# Patient Record
Sex: MALE | Race: WHITE | NOT HISPANIC OR LATINO | Employment: FULL TIME | URBAN - METROPOLITAN AREA
[De-identification: names, ages, dates, MRNs, and addresses within clinical notes are randomized per-mention and may not be internally consistent; named-entity substitution may affect disease eponyms.]

---

## 2017-08-28 ENCOUNTER — OFFICE VISIT - HEALTHEAST (OUTPATIENT)
Dept: FAMILY MEDICINE | Facility: CLINIC | Age: 32
End: 2017-08-28

## 2017-08-28 DIAGNOSIS — E78.5 HYPERLIPIDEMIA: ICD-10-CM

## 2017-08-28 DIAGNOSIS — I10 HYPERTENSION: ICD-10-CM

## 2017-08-28 DIAGNOSIS — Z00.00 ROUTINE GENERAL MEDICAL EXAMINATION AT A HEALTH CARE FACILITY: ICD-10-CM

## 2017-08-28 LAB
CHOLEST SERPL-MCNC: 252 MG/DL
FASTING STATUS PATIENT QL REPORTED: YES
HDLC SERPL-MCNC: 29 MG/DL
LDLC SERPL CALC-MCNC: 122 MG/DL
LDLC SERPL CALC-MCNC: ABNORMAL MG/DL
TRIGL SERPL-MCNC: 408 MG/DL

## 2017-08-28 ASSESSMENT — MIFFLIN-ST. JEOR: SCORE: 2353.53

## 2017-09-11 ENCOUNTER — AMBULATORY - HEALTHEAST (OUTPATIENT)
Dept: NURSING | Facility: CLINIC | Age: 32
End: 2017-09-11

## 2017-10-17 ENCOUNTER — COMMUNICATION - HEALTHEAST (OUTPATIENT)
Dept: FAMILY MEDICINE | Facility: CLINIC | Age: 32
End: 2017-10-17

## 2017-10-17 ENCOUNTER — AMBULATORY - HEALTHEAST (OUTPATIENT)
Dept: NURSING | Facility: CLINIC | Age: 32
End: 2017-10-17

## 2017-10-17 DIAGNOSIS — R74.8 ELEVATED LIVER ENZYMES: ICD-10-CM

## 2017-10-17 DIAGNOSIS — E78.5 HYPERLIPIDEMIA: ICD-10-CM

## 2017-12-02 ENCOUNTER — AMBULATORY - HEALTHEAST (OUTPATIENT)
Dept: LAB | Facility: HOSPITAL | Age: 32
End: 2017-12-02

## 2017-12-02 DIAGNOSIS — E78.5 HYPERLIPIDEMIA: ICD-10-CM

## 2017-12-02 DIAGNOSIS — R74.8 ELEVATED LIVER ENZYMES: ICD-10-CM

## 2017-12-02 LAB
CHOLEST SERPL-MCNC: 238 MG/DL
FASTING STATUS PATIENT QL REPORTED: YES
HDLC SERPL-MCNC: 28 MG/DL
LDLC SERPL CALC-MCNC: 157 MG/DL
TRIGL SERPL-MCNC: 267 MG/DL

## 2017-12-04 LAB
HAV IGM SERPL QL IA: NEGATIVE
HBV CORE IGM SERPL QL IA: NEGATIVE
HBV SURFACE AG SERPL QL IA: NEGATIVE
HCV AB SERPL QL IA: NEGATIVE

## 2018-02-12 ENCOUNTER — AMBULATORY - HEALTHEAST (OUTPATIENT)
Dept: NURSING | Facility: CLINIC | Age: 33
End: 2018-02-12

## 2018-04-08 ENCOUNTER — COMMUNICATION - HEALTHEAST (OUTPATIENT)
Dept: FAMILY MEDICINE | Facility: CLINIC | Age: 33
End: 2018-04-08

## 2018-04-08 DIAGNOSIS — R03.0 ELEVATED BLOOD PRESSURE READING WITHOUT DIAGNOSIS OF HYPERTENSION: ICD-10-CM

## 2018-07-23 ENCOUNTER — COMMUNICATION - HEALTHEAST (OUTPATIENT)
Dept: FAMILY MEDICINE | Facility: CLINIC | Age: 33
End: 2018-07-23

## 2018-07-23 DIAGNOSIS — R03.0 ELEVATED BLOOD PRESSURE READING WITHOUT DIAGNOSIS OF HYPERTENSION: ICD-10-CM

## 2018-08-13 ENCOUNTER — OFFICE VISIT - HEALTHEAST (OUTPATIENT)
Dept: FAMILY MEDICINE | Facility: CLINIC | Age: 33
End: 2018-08-13

## 2018-08-13 DIAGNOSIS — E78.5 HYPERLIPIDEMIA: ICD-10-CM

## 2018-08-13 DIAGNOSIS — E66.01 MORBID OBESITY (H): ICD-10-CM

## 2018-08-13 DIAGNOSIS — I10 HTN (HYPERTENSION): ICD-10-CM

## 2018-08-13 DIAGNOSIS — R03.0 ELEVATED BLOOD PRESSURE READING WITHOUT DIAGNOSIS OF HYPERTENSION: ICD-10-CM

## 2018-08-13 RX ORDER — LISINOPRIL 20 MG/1
20 TABLET ORAL DAILY
Qty: 90 TABLET | Refills: 3 | Status: SHIPPED
Start: 2018-08-13 | End: 2022-03-29

## 2018-08-13 RX ORDER — CHLORAL HYDRATE 500 MG
2 CAPSULE ORAL DAILY
Status: SHIPPED | COMMUNITY
Start: 2018-08-13 | End: 2022-03-29

## 2018-08-13 ASSESSMENT — MIFFLIN-ST. JEOR: SCORE: 2373.38

## 2018-09-02 ENCOUNTER — COMMUNICATION - HEALTHEAST (OUTPATIENT)
Dept: FAMILY MEDICINE | Facility: CLINIC | Age: 33
End: 2018-09-02

## 2018-09-02 DIAGNOSIS — R03.0 ELEVATED BLOOD PRESSURE READING WITHOUT DIAGNOSIS OF HYPERTENSION: ICD-10-CM

## 2018-09-03 RX ORDER — LISINOPRIL 20 MG/1
20 TABLET ORAL DAILY
Qty: 90 TABLET | Refills: 3 | Status: SHIPPED | OUTPATIENT
Start: 2018-09-03 | End: 2022-03-29

## 2021-05-31 VITALS — WEIGHT: 305 LBS | HEIGHT: 72 IN | BODY MASS INDEX: 41.31 KG/M2

## 2021-06-01 VITALS — BODY MASS INDEX: 41.9 KG/M2 | HEIGHT: 72 IN | WEIGHT: 309.38 LBS

## 2021-06-12 NOTE — PROGRESS NOTES
Assessment/ Plan     1. Routine general medical examination at a health care facility    The following high BMI interventions were performed this visit: encouragement to exercise and weight monitoring   Reviewed weight loss options.  He can be considered for a referral to nutritionist or possibly be referred to the [place] [facility]    2. Hypertension    Recommend starting lisinopril  Review potential side effects    Recommend baseline laboratory testing  Encouraged improvement in diet and recommend weight loss    - Basic Metabolic Panel  - HM2(CBC w/o Differential)  - Basic Metabolic Panel  - Lipid Cascade  - Hepatic Profile  - HM2(CBC w/o Differential)    Recommend follow-up to recheck his blood pressure  Favor an ECG in the future for a baseline    3. Hyperlipidemia    Check cholesterol testing  - Lipid Cascade; Future  - Lipid Cascade  - Custom LDL Cholesterol, Direct      Subjective:       Mayco Bobo is a 31 y.o. male who presents to the clinic to reestablish care.  He presents for a complete physical examination.  He states that he has been seen at another clinic in the recent past.  His medical history is generally notable for hyperlipidemia.  His last total cholesterol was 211 with an LDL of 136.  His triglycerides were elevated to 239 and HDL was low at 27.  He did take a statin cholesterol-lowering medication for period of time but has since discontinued that.  He has noted that his blood pressure has been more elevated recently.  He did have elevated readings while visiting his dentist with a value of 138/110 and 132/102.  He has had elevated blood pressure readings in the past.  In general, he has felt well.  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  He likely reacted to the soap and since starting a new job he has not had ongoing symptoms.  He states he generally feels well.  He remains  "overweight and it can be challenging for him to get regular aerobic exercise.  Review of systems negative for headache, disease, chest pain, or palpitations.  Only history is updated in the chart.    The following portions of the patient's history were reviewed and updated as appropriate: allergies, current medications, past family history, past medical history, past social history, past surgical history and problem list.    Review of Systems   A 12 point comprehensive review of systems was negative except as noted.      Current Outpatient Prescriptions   Medication Sig Dispense Refill     MULTIVIT-MINERALS/FERROUS FUM (MULTI VITAMIN ORAL) Take by mouth daily.       lisinopril (PRINIVIL,ZESTRIL) 10 MG tablet Take 1 tablet (10 mg total) by mouth daily. 30 tablet 3     No current facility-administered medications for this visit.        Objective:      /88  Pulse 88  Temp 98.6  F (37  C) (Oral)   Resp 20  Ht 5' 11.5\" (1.816 m)  Wt (!) 305 lb (138.3 kg)  BMI 41.95 kg/m2      General appearance: alert, appears stated age and cooperative  Head: Normocephalic, without obvious abnormality, atraumatic  Eyes: conjunctivae/corneas clear. PERRL, EOM's intact.   Ears: normal TM's and external ear canals both ears  Nose: Nares normal. Septum midline. Mucosa normal. No drainage or sinus tenderness.  Throat: lips, mucosa, and tongue normal; teeth and gums normal  Neck: no adenopathy, no carotid bruit, no JVD, supple, symmetrical, trachea midline and thyroid not enlarged, symmetric, no tenderness/mass/nodules  Back: symmetric, no curvature. ROM normal. No CVA tenderness.  Lungs: clear to auscultation bilaterally  Heart: regular rate and rhythm, S1, S2 normal, no murmur, click, rub or gallop  Abdomen: soft, non-tender; bowel sounds normal; no masses,  no organomegaly  Genitourinary: No scrotal masses, no inguinal hernia  Extremities: extremities normal, atraumatic, no cyanosis or edema  Pulses: 2+ and symmetric  Skin: " Skin color, texture, turgor normal. No rashes or lesions  Lymph nodes: Cervical, supraclavicular, and axillary nodes normal.  Neurologic: Alert and oriented X 3. Normal symmetric reflexes. Normal coordination and gait         Recent Results (from the past 168 hour(s))   Basic Metabolic Panel   Result Value Ref Range    Sodium 138 136 - 145 mmol/L    Potassium 4.0 3.5 - 5.0 mmol/L    Chloride 103 98 - 107 mmol/L    CO2 27 22 - 31 mmol/L    Anion Gap, Calculation 8 5 - 18 mmol/L    Glucose 78 70 - 125 mg/dL    Calcium 9.4 8.5 - 10.5 mg/dL    BUN 11 8 - 22 mg/dL    Creatinine 0.76 0.70 - 1.30 mg/dL    GFR MDRD Af Amer >60 >60 mL/min/1.73m2    GFR MDRD Non Af Amer >60 >60 mL/min/1.73m2   Lipid Cascade   Result Value Ref Range    Cholesterol 252 (H) <=199 mg/dL    Triglycerides 408 (H) <=149 mg/dL    HDL Cholesterol 29 (L) >=40 mg/dL    LDL Calculated  <=129 mg/dL    Patient Fasting > 8hrs? Yes    HM2(CBC w/o Differential)   Result Value Ref Range    WBC 9.0 4.0 - 11.0 thou/uL    RBC 5.04 4.40 - 6.20 mill/uL    Hemoglobin 14.7 14.0 - 18.0 g/dL    Hematocrit 44.0 40.0 - 54.0 %    MCV 87 80 - 100 fL    MCH 29.1 27.0 - 34.0 pg    MCHC 33.3 32.0 - 36.0 g/dL    RDW 12.5 11.0 - 14.5 %    Platelets 314 140 - 440 thou/uL    MPV 7.3 7.0 - 10.0 fL   Custom LDL Cholesterol, Direct   Result Value Ref Range    Direct  <=129 mg/dl   Hepatic Profile   Result Value Ref Range    Bilirubin, Total 0.6 0.0 - 1.0 mg/dL    Bilirubin, Direct 0.2 <=0.5 mg/dL    Protein, Total 7.5 6.0 - 8.0 g/dL    Albumin 4.2 3.5 - 5.0 g/dL    Alkaline Phosphatase 113 45 - 120 U/L    AST 43 (H) 0 - 40 U/L    ALT 98 (H) 0 - 45 U/L          This note has been dictated using voice recognition software. Any grammatical or context distortions are unintentional and inherent to the software

## 2021-06-19 NOTE — PROGRESS NOTES
Assessment/ Plan     1. HTN (hypertension)  2. Blood Pressure Isolated Elevated    Blood pressure was initially elevated but normalized  Continue lisinopril 20 mg daily  Recommend increased aerobic exercise and recommend weight loss  Reviewed his blood pressure readings which generally have been well controlled.  - lisinopril (PRINIVIL,ZESTRIL) 20 MG tablet; Take 1 tablet (20 mg total) by mouth daily.  Dispense: 90 tablet; Refill: 3  Follow-up to check a basic metabolic panel in the future    3.  Hyperlipidemia    Recommend improvement in diet and exercise  Follow-up to check a lipid cascade in the future    4.  Morbid obesity    Recommend increasing aerobic exercise and encourage weight loss    Patient agrees to plan        Subjective:       Mayco Bobo is a 32 y.o. male who presents to the clinic in follow-up for hypertension.  In August of 2017 his blood pressure was noted to be elevated and he was started on lisinopril.  He has tolerated his medication and denies significant side effects.  He has been compliant with his medication and his blood pressure has generally been well controlled.  He does have a listing of his blood pressure readings recently and they have generally been at goal. In August his cholesterol numbers were elevated.  His total cholesterol was 238 with an LDL of 157.  Triglycerides were elevated to 267.  He reports he generally has been feeling well.  Denies headache, dizziness, chest pain, cough, or shortness of breath.  He does not believe that he snores on a continuous basis denies daytime somnolence.    The following portions of the patient's history were reviewed and updated as appropriate: allergies, current medications, past family history, past medical history, past social history, past surgical history and problem list.    Review of Systems   A 12 point comprehensive review of systems was negative except as noted.      Current Outpatient Prescriptions   Medication Sig Dispense  "Refill     lisinopril (PRINIVIL,ZESTRIL) 20 MG tablet Take 1 tablet (20 mg total) by mouth daily. 90 tablet 3     MULTIVIT-MINERALS/FERROUS FUM (MULTI VITAMIN ORAL) Take by mouth daily.       omega-3/dha/epa/fish oil (FISH OIL-OMEGA-3 FATTY ACIDS) 300-1,000 mg capsule Take 2 g by mouth daily.       No current facility-administered medications for this visit.        Objective:      /84  Pulse 72  Temp 98.8  F (37.1  C) (Oral)   Resp 12  Ht 5' 11.5\" (1.816 m)  Wt (!) 309 lb 6 oz (140.3 kg)  BMI 42.55 kg/m2      General appearance: alert, appears stated age and cooperative  Head: Normocephalic, without obvious abnormality, atraumatic  Eyes: conjunctivae/corneas clear. PERRL, EOM's intact.   Nose: Nares normal. Septum midline. Mucosa normal. No drainage or sinus tenderness.  Neck: no adenopathy, supple, symmetrical  Lungs: clear to auscultation bilaterally  Heart: regular rate and rhythm, S1, S2 normal, no murmur, click, rub or gallop  Extremities: extremities normal, atraumatic, no cyanosis or edema  Skin: Skin color, texture, turgor normal. No rashes or lesions  Lymph nodes: Cervical nodes normal.  Neurologic: Alert and oriented X 3         No results found for this or any previous visit (from the past 168 hour(s)).       This note has been dictated using voice recognition software. Any grammatical or context distortions are unintentional and inherent to the software  "

## 2021-08-21 ENCOUNTER — HEALTH MAINTENANCE LETTER (OUTPATIENT)
Age: 36
End: 2021-08-21

## 2021-10-16 ENCOUNTER — HEALTH MAINTENANCE LETTER (OUTPATIENT)
Age: 36
End: 2021-10-16

## 2022-03-29 ENCOUNTER — OFFICE VISIT (OUTPATIENT)
Dept: FAMILY MEDICINE | Facility: CLINIC | Age: 37
End: 2022-03-29
Payer: COMMERCIAL

## 2022-03-29 VITALS
WEIGHT: 310.1 LBS | TEMPERATURE: 97.6 F | HEART RATE: 77 BPM | HEIGHT: 72 IN | BODY MASS INDEX: 42 KG/M2 | SYSTOLIC BLOOD PRESSURE: 140 MMHG | DIASTOLIC BLOOD PRESSURE: 96 MMHG

## 2022-03-29 DIAGNOSIS — Z00.01 ENCOUNTER FOR GENERAL ADULT MEDICAL EXAMINATION WITH ABNORMAL FINDINGS: Primary | ICD-10-CM

## 2022-03-29 DIAGNOSIS — Z11.4 SCREENING FOR HIV (HUMAN IMMUNODEFICIENCY VIRUS): ICD-10-CM

## 2022-03-29 DIAGNOSIS — I10 BENIGN ESSENTIAL HYPERTENSION: ICD-10-CM

## 2022-03-29 DIAGNOSIS — E66.01 MORBID OBESITY (H): ICD-10-CM

## 2022-03-29 DIAGNOSIS — Z13.1 SCREENING FOR DIABETES MELLITUS: ICD-10-CM

## 2022-03-29 DIAGNOSIS — Z13.220 LIPID SCREENING: ICD-10-CM

## 2022-03-29 LAB
CHOLEST SERPL-MCNC: 215 MG/DL
FASTING STATUS PATIENT QL REPORTED: YES
FASTING STATUS PATIENT QL REPORTED: YES
GLUCOSE BLD-MCNC: 96 MG/DL (ref 70–99)
HDLC SERPL-MCNC: 27 MG/DL
LDLC SERPL CALC-MCNC: 104 MG/DL
LDLC SERPL CALC-MCNC: ABNORMAL MG/DL
NONHDLC SERPL-MCNC: 188 MG/DL
TRIGL SERPL-MCNC: 437 MG/DL

## 2022-03-29 PROCEDURE — 87389 HIV-1 AG W/HIV-1&-2 AB AG IA: CPT | Performed by: FAMILY MEDICINE

## 2022-03-29 PROCEDURE — 80061 LIPID PANEL: CPT | Performed by: FAMILY MEDICINE

## 2022-03-29 PROCEDURE — 90471 IMMUNIZATION ADMIN: CPT | Performed by: FAMILY MEDICINE

## 2022-03-29 PROCEDURE — 90714 TD VACC NO PRESV 7 YRS+ IM: CPT | Performed by: FAMILY MEDICINE

## 2022-03-29 PROCEDURE — 82947 ASSAY GLUCOSE BLOOD QUANT: CPT | Performed by: FAMILY MEDICINE

## 2022-03-29 PROCEDURE — 36415 COLL VENOUS BLD VENIPUNCTURE: CPT | Performed by: FAMILY MEDICINE

## 2022-03-29 PROCEDURE — 99385 PREV VISIT NEW AGE 18-39: CPT | Mod: 25 | Performed by: FAMILY MEDICINE

## 2022-03-29 PROCEDURE — 83721 ASSAY OF BLOOD LIPOPROTEIN: CPT | Mod: 59 | Performed by: FAMILY MEDICINE

## 2022-03-29 PROCEDURE — 99214 OFFICE O/P EST MOD 30 MIN: CPT | Mod: 25 | Performed by: FAMILY MEDICINE

## 2022-03-29 RX ORDER — LISINOPRIL 20 MG/1
20 TABLET ORAL DAILY
Qty: 90 TABLET | Refills: 0 | Status: SHIPPED | OUTPATIENT
Start: 2022-03-29 | End: 2022-06-20

## 2022-03-29 ASSESSMENT — ENCOUNTER SYMPTOMS
COUGH: 0
PALPITATIONS: 0
PARESTHESIAS: 0
HEARTBURN: 0
WEAKNESS: 0
MYALGIAS: 0
CONSTIPATION: 0
HEMATOCHEZIA: 0
HEADACHES: 0
ABDOMINAL PAIN: 0
DIZZINESS: 0
NERVOUS/ANXIOUS: 0
HEMATURIA: 0
JOINT SWELLING: 0
DIARRHEA: 0
SORE THROAT: 0
FEVER: 0
FREQUENCY: 0
DYSURIA: 0
NAUSEA: 0
CHILLS: 0
ARTHRALGIAS: 0
SHORTNESS OF BREATH: 0
EYE PAIN: 0

## 2022-03-29 NOTE — PROGRESS NOTES
SUBJECTIVE:   CC: Mayco Bobo is an 36 year old male who presents for preventative health visit.     Patient has been advised of split billing requirements and indicates understanding: Yes     Healthy Habits:     Getting at least 3 servings of Calcium per day:  Yes    Bi-annual eye exam:  NO    Dental care twice a year:  Yes    Sleep apnea or symptoms of sleep apnea:  None    Diet:  Regular (no restrictions)    Frequency of exercise:  1 day/week    Duration of exercise:  45-60 minutes    Taking medications regularly:  Yes    Medication side effects:  None    PHQ-2 Total Score: 2    Additional concerns today:  No      Feeling a little down in the last couple weeks  Doesn't want help right now  Thinks it is weather related   Wishes he had a partner, feeling sad that he is single     Today's PHQ-2 Score: 2  PHQ-2 ( 1999 [employer]) 3/29/2022   Q1: Little interest or pleasure in doing things 1   Q2: Feeling down, depressed or hopeless 1   PHQ-2 Score 2   Q1: Little interest or pleasure in doing things Several days   Q2: Feeling down, depressed or hopeless Several days   PHQ-2 Score 2       Abuse: Current or Past(Physical, Sexual or Emotional)- No  Do you feel safe in your environment? Yes    Have you ever done Advance Care Planning? (For example, a Health Directive, POLST, or a discussion with a medical provider or your loved ones about your wishes): No, advance care planning information given to patient to review.  Patient declined advance care planning discussion at this time.    Social History     Tobacco Use     Smoking status: Never Smoker     Smokeless tobacco: Never Used   Substance Use Topics     Alcohol use: Never     If you drink alcohol do you typically have >3 drinks per day or >7 drinks per week? No    Alcohol Use 3/29/2022   Prescreen: >3 drinks/day or >7 drinks/week? Not Applicable   Prescreen: >3 drinks/day or >7 drinks/week? -   No flowsheet data found.    Last PSA: No results found for: PSA    Reviewed  "orders with patient. Reviewed health maintenance and updated orders accordingly - Yes  Lab work is in process    Reviewed and updated as needed this visit by clinical staff                  Reviewed and updated as needed this visit by Provider      Review of Systems   Constitutional: Negative for chills and fever.   HENT: Negative for congestion, ear pain, hearing loss and sore throat.    Eyes: Negative for pain and visual disturbance.   Respiratory: Negative for cough and shortness of breath.    Cardiovascular: Negative for chest pain, palpitations and peripheral edema.   Gastrointestinal: Negative for abdominal pain, constipation, diarrhea, heartburn, hematochezia and nausea.   Genitourinary: Negative for dysuria, frequency, genital sores, hematuria, impotence, penile discharge and urgency.   Musculoskeletal: Negative for arthralgias, joint swelling and myalgias.   Skin: Negative for rash.   Neurological: Negative for dizziness, weakness, headaches and paresthesias.   Psychiatric/Behavioral: Negative for mood changes. The patient is not nervous/anxious.        OBJECTIVE:   BP (!) 140/92   Pulse 77   Temp 97.6  F (36.4  C) (Tympanic)   Ht 1.835 m (6' 0.25\")   Wt 140.7 kg (310 lb 1.6 oz)   BMI 41.77 kg/m     BP Readings from Last 6 Encounters:   03/29/22 (!) 140/92         Physical Exam  GENERAL: healthy, alert and no distress  EYES: Eyes grossly normal to inspection, PERRL and conjunctivae and sclerae normal  HENT: ear canals and TM's normal, nose and mouth without ulcers or lesions  NECK: no adenopathy, no asymmetry, masses, or scars and thyroid normal to palpation  RESP: lungs clear to auscultation - no rales, rhonchi or wheezes  CV: regular rate and rhythm, normal S1 S2, no S3 or S4, no murmur, click or rub, no peripheral edema and peripheral pulses strong  ABDOMEN: soft, nontender, no hepatosplenomegaly, no masses and bowel sounds normal  MS: no gross musculoskeletal defects noted, no edema  SKIN: no " suspicious lesions or rashes  NEURO: Normal strength and tone, mentation intact and speech normal  PSYCH: mentation appears normal, affect normal/bright    Diagnostic Test Results:  Labs reviewed in Epic    ASSESSMENT/PLAN:   (Z00.01) Encounter for general adult medical examination with abnormal findings  (primary encounter diagnosis)  Comment: We discussed self testicular exams, exercise 30mins/day, and calcium with vitamin D at 1200mg/day, preferably from dietary sources.  Diet, Weight loss, and Exercise were discussed as well.       (I10) Benign essential hypertension  Comment: Discussed hypertension in detail including JNC VII guidelines for blood pressure goals. Discussed indication for treatment and treatment options. Discussed the importance for aggressive management of HTN to prevent vascular complications later. Recommended lower fat, lower carbohydrate, and lower sodium (<2000 mg)diet. Discussed required intervals for follow up on HTN, lab studies, and the need to aggresive management of other cardiac disease risk factors. Recommended patient follow their blood pressures outside the clinic to ensure that BPs are remaining within guidelines, and to contact me if the readings are not within guidelines so we can adjust treatment as needed     Restarted lisinopril. Check lytes and blood pressure in two weeks. The patient indicates understanding of these issues and agrees with the plan.     Plan: lisinopril (ZESTRIL) 20 MG tablet, Basic         metabolic panel  (Ca, Cl, CO2, Creat, Gluc, K,         Na, BUN)            (Z11.4) Screening for HIV (human immunodeficiency virus)  Comment: discussed   Plan: HIV Antigen Antibody Combo            (Z13.220) Lipid screening  Comment: discussed   Plan: Lipid panel reflex to direct LDL Fasting            (Z13.1) Screening for diabetes mellitus  Comment: discussed   Plan: Glucose            (E66.01) Morbid obesity (H)  Comment: discussed lifestyle mods   Plan:     Patient  "has been advised of split billing requirements and indicates understanding: Yes    COUNSELING:   Reviewed preventive health counseling, as reflected in patient instructions       Regular exercise       Healthy diet/nutrition    Estimated body mass index is 42.55 kg/m  as calculated from the following:    Height as of 8/13/18: 1.816 m (5' 11.5\").    Weight as of 8/13/18: 140.3 kg (309 lb 6 oz).     Weight management plan: Discussed healthy diet and exercise guidelines    He reports that he has never smoked. He has never used smokeless tobacco.      Counseling Resources:  ATP IV Guidelines  Pooled Cohorts Equation Calculator  FRAX Risk Assessment  ICSI Preventive Guidelines  Dietary Guidelines for Americans, 2010  USDA's MyPlate  ASA Prophylaxis  Lung CA Screening    Zayda Gardner MD  [facility]  "

## 2022-03-29 NOTE — NURSING NOTE
Prior to immunization administration, verified patients identity using patient s name and date of birth. Please see Immunization Activity for additional information.     Screening Questionnaire for Adult Immunization    Are you sick today?   No   Do you have allergies to medications, food, a vaccine component or latex?   No   Have you ever had a serious reaction after receiving a vaccination?   No   Do you have a long-term health problem with heart, lung, kidney, or metabolic disease (e.g., diabetes), asthma, a blood disorder, no spleen, complement component deficiency, a cochlear implant, or a spinal fluid leak?  Are you on long-term aspirin therapy?   No   Do you have cancer, leukemia, HIV/AIDS, or any other immune system problem?   No   Do you have a parent, brother, or sister with an immune system problem?   No   In the past 3 months, have you taken medications that affect  your immune system, such as prednisone, other steroids, or anticancer drugs; drugs for the treatment of rheumatoid arthritis, Crohn s disease, or psoriasis; or have you had radiation treatments?   No   Have you had a seizure, or a brain or other nervous system problem?   No   During the past year, have you received a transfusion of blood or blood    products, or been given immune (gamma) globulin or antiviral drug?   No   For women: Are you pregnant or is there a chance you could become       pregnant during the next month?   No   Have you received any vaccinations in the past 4 weeks?   No     Immunization questionnaire answers were all negative.        Per orders of Dr. Zayda Gardner, injection of TD given by Daisy Hough LPN. Patient instructed to remain in clinic for 15 minutes afterwards, and to report any adverse reaction to me immediately.       Screening performed by Daisy Hough LPN on 3/29/2022 at 8:47 AM.

## 2022-03-30 LAB — HIV 1+2 AB+HIV1 P24 AG SERPL QL IA: NONREACTIVE

## 2022-04-12 ENCOUNTER — TELEPHONE (OUTPATIENT)
Dept: FAMILY MEDICINE | Facility: CLINIC | Age: 37
End: 2022-04-12

## 2022-04-12 ENCOUNTER — ALLIED HEALTH/NURSE VISIT (OUTPATIENT)
Dept: FAMILY MEDICINE | Facility: CLINIC | Age: 37
End: 2022-04-12
Payer: COMMERCIAL

## 2022-04-12 ENCOUNTER — LAB (OUTPATIENT)
Dept: LAB | Facility: CLINIC | Age: 37
End: 2022-04-12
Payer: COMMERCIAL

## 2022-04-12 VITALS — HEART RATE: 65 BPM | DIASTOLIC BLOOD PRESSURE: 86 MMHG | SYSTOLIC BLOOD PRESSURE: 138 MMHG | OXYGEN SATURATION: 99 %

## 2022-04-12 DIAGNOSIS — I10 BENIGN ESSENTIAL HYPERTENSION: Primary | ICD-10-CM

## 2022-04-12 DIAGNOSIS — I10 BENIGN ESSENTIAL HYPERTENSION: ICD-10-CM

## 2022-04-12 LAB
ANION GAP SERPL CALCULATED.3IONS-SCNC: 5 MMOL/L (ref 3–14)
BUN SERPL-MCNC: 16 MG/DL (ref 7–30)
CALCIUM SERPL-MCNC: 8.9 MG/DL (ref 8.5–10.1)
CHLORIDE BLD-SCNC: 102 MMOL/L (ref 94–109)
CO2 SERPL-SCNC: 29 MMOL/L (ref 20–32)
CREAT SERPL-MCNC: 0.69 MG/DL (ref 0.66–1.25)
GFR SERPL CREATININE-BSD FRML MDRD: >90 ML/MIN/1.73M2
GLUCOSE BLD-MCNC: 94 MG/DL (ref 70–99)
POTASSIUM BLD-SCNC: 4 MMOL/L (ref 3.4–5.3)
SODIUM SERPL-SCNC: 136 MMOL/L (ref 133–144)

## 2022-04-12 PROCEDURE — 99207 PR NO CHARGE NURSE ONLY: CPT

## 2022-04-12 PROCEDURE — 80048 BASIC METABOLIC PNL TOTAL CA: CPT

## 2022-04-12 PROCEDURE — 36415 COLL VENOUS BLD VENIPUNCTURE: CPT

## 2022-04-12 NOTE — PROGRESS NOTES
Blood pressures look good!   Continue current med   Labs done today and pending     BP Readings from Last 6 Encounters:   04/12/22 138/86   03/29/22 (!) 140/96     ASHER Gardner MD

## 2022-04-12 NOTE — Clinical Note
Please let him know that I reviewed his data and things look better. Labs pending - results will be released to [email or web address]. Continue current med(s)

## 2022-04-12 NOTE — TELEPHONE ENCOUNTER
Called and left patient a message to call clinic back.   IVANIA Rhoades Christine E, MD sent to Daisy Hough LPN  Please let him know that I reviewed his data and things look better. Labs pending - results will be released to [employer]. Continue current med(s)

## 2022-04-12 NOTE — PROGRESS NOTES
SUBJECTIVE:  Mayco Bobo is a 36 year old male who presents for a follow up evaluation of his hypertension.    The reason for the visit is:  a recent medication change    Patient is taking medication as prescribed  Patient is tolerating medications well.  Patient is monitoring Blood Pressure at home.  Average readings if yes are 120-130's/80's    Current complaints: none      Current Outpatient Medications   Medication     lisinopril (ZESTRIL) 20 MG tablet     MULTIVIT-MINERALS/FERROUS FUM (MULTI VITAMIN ORAL)     No current facility-administered medications for this visit.       Allergies   Allergen Reactions     Nickel Unknown         OBJECTIVE:  Please get a blood pressure AND a pulse.  A height is also needed if has not been done in the past year.    There were no vitals taken for this visit.    Vitals as recorded, a large cuff was used.    ASSESSMENT:    Is the HYPERTENSION goal on the problem list? Yes  Patient Active Problem List   Diagnosis     Hyperlipidemia     Contact Dermatitis     Benign essential hypertension     Morbid obesity (H)       Plan:  The patient's blood pressure is less than documented goal. The patient will be discharged home. CC: this note to the patient's primary provider.

## 2022-06-15 ENCOUNTER — MYC MEDICAL ADVICE (OUTPATIENT)
Dept: FAMILY MEDICINE | Facility: CLINIC | Age: 37
End: 2022-06-15
Payer: COMMERCIAL

## 2022-06-15 DIAGNOSIS — I10 BENIGN ESSENTIAL HYPERTENSION: ICD-10-CM

## 2022-06-20 RX ORDER — LISINOPRIL 20 MG/1
20 TABLET ORAL DAILY
Qty: 90 TABLET | Refills: 0 | Status: SHIPPED | OUTPATIENT
Start: 2022-06-20 | End: 2022-09-15

## 2022-09-14 ENCOUNTER — MYC MEDICAL ADVICE (OUTPATIENT)
Dept: FAMILY MEDICINE | Facility: CLINIC | Age: 37
End: 2022-09-14

## 2022-09-14 DIAGNOSIS — I10 BENIGN ESSENTIAL HYPERTENSION: ICD-10-CM

## 2022-09-15 RX ORDER — LISINOPRIL 20 MG/1
20 TABLET ORAL DAILY
Qty: 90 TABLET | Refills: 1 | Status: SHIPPED | OUTPATIENT
Start: 2022-09-15 | End: 2023-03-24

## 2022-10-01 ENCOUNTER — HEALTH MAINTENANCE LETTER (OUTPATIENT)
Age: 37
End: 2022-10-01

## 2023-03-17 ASSESSMENT — ENCOUNTER SYMPTOMS
FREQUENCY: 0
NAUSEA: 0
JOINT SWELLING: 0
HEADACHES: 0
DIARRHEA: 0
PALPITATIONS: 0
ABDOMINAL PAIN: 0
HEARTBURN: 0
DYSURIA: 0
HEMATURIA: 0
MYALGIAS: 0
PARESTHESIAS: 0
CHILLS: 0
HEMATOCHEZIA: 0
NERVOUS/ANXIOUS: 0
SHORTNESS OF BREATH: 0
EYE PAIN: 0
SORE THROAT: 0
WEAKNESS: 0
CONSTIPATION: 0
ARTHRALGIAS: 0
COUGH: 0
DIZZINESS: 0
FEVER: 0

## 2023-03-24 ENCOUNTER — OFFICE VISIT (OUTPATIENT)
Dept: FAMILY MEDICINE | Facility: CLINIC | Age: 38
End: 2023-03-24
Payer: COMMERCIAL

## 2023-03-24 VITALS
HEART RATE: 94 BPM | HEIGHT: 73 IN | BODY MASS INDEX: 41.75 KG/M2 | OXYGEN SATURATION: 98 % | DIASTOLIC BLOOD PRESSURE: 88 MMHG | SYSTOLIC BLOOD PRESSURE: 137 MMHG | WEIGHT: 315 LBS | TEMPERATURE: 98.4 F

## 2023-03-24 DIAGNOSIS — I10 BENIGN ESSENTIAL HYPERTENSION: ICD-10-CM

## 2023-03-24 DIAGNOSIS — Z13.1 SCREENING FOR DIABETES MELLITUS: ICD-10-CM

## 2023-03-24 DIAGNOSIS — Z00.01 ENCOUNTER FOR ROUTINE ADULT MEDICAL EXAM WITH ABNORMAL FINDINGS: Primary | ICD-10-CM

## 2023-03-24 DIAGNOSIS — E78.1 PURE HYPERTRIGLYCERIDEMIA: ICD-10-CM

## 2023-03-24 LAB
CHOLEST SERPL-MCNC: 219 MG/DL
FASTING STATUS PATIENT QL REPORTED: YES
GLUCOSE SERPL-MCNC: 100 MG/DL (ref 70–99)
HDLC SERPL-MCNC: 31 MG/DL
LDLC SERPL CALC-MCNC: ABNORMAL MG/DL
LDLC SERPL DIRECT ASSAY-MCNC: 99 MG/DL
NONHDLC SERPL-MCNC: 188 MG/DL
TRIGL SERPL-MCNC: 511 MG/DL

## 2023-03-24 PROCEDURE — 83721 ASSAY OF BLOOD LIPOPROTEIN: CPT | Mod: 59 | Performed by: FAMILY MEDICINE

## 2023-03-24 PROCEDURE — 99395 PREV VISIT EST AGE 18-39: CPT | Performed by: FAMILY MEDICINE

## 2023-03-24 PROCEDURE — 82947 ASSAY GLUCOSE BLOOD QUANT: CPT | Performed by: FAMILY MEDICINE

## 2023-03-24 PROCEDURE — 36415 COLL VENOUS BLD VENIPUNCTURE: CPT | Performed by: FAMILY MEDICINE

## 2023-03-24 PROCEDURE — 99214 OFFICE O/P EST MOD 30 MIN: CPT | Mod: 25 | Performed by: FAMILY MEDICINE

## 2023-03-24 PROCEDURE — 80061 LIPID PANEL: CPT | Performed by: FAMILY MEDICINE

## 2023-03-24 RX ORDER — LISINOPRIL 40 MG/1
40 TABLET ORAL DAILY
Qty: 90 TABLET | Refills: 3 | Status: SHIPPED | OUTPATIENT
Start: 2023-03-24 | End: 2024-03-21

## 2023-03-24 ASSESSMENT — ENCOUNTER SYMPTOMS
SORE THROAT: 0
COUGH: 0
CONSTIPATION: 0
HEARTBURN: 0
HEMATURIA: 0
NAUSEA: 0
EYE PAIN: 0
FREQUENCY: 0
HEADACHES: 0
ARTHRALGIAS: 0
JOINT SWELLING: 0
FEVER: 0
SHORTNESS OF BREATH: 0
NERVOUS/ANXIOUS: 0
DIZZINESS: 0
PALPITATIONS: 0
DYSURIA: 0
CHILLS: 0
DIARRHEA: 0
HEMATOCHEZIA: 0
WEAKNESS: 0
PARESTHESIAS: 0
MYALGIAS: 0
ABDOMINAL PAIN: 0

## 2023-03-24 NOTE — PROGRESS NOTES
SUBJECTIVE:   CC: Eugene is an 37 year old who presents for preventative health visit.   No flowsheet data found.Patient has been advised of split billing requirements and indicates understanding: Yes  Healthy Habits:     Getting at least 3 servings of Calcium per day:  Yes    Bi-annual eye exam:  Yes    Dental care twice a year:  Yes    Sleep apnea or symptoms of sleep apnea:  None    Diet:  Regular (no restrictions)    Frequency of exercise:  1 day/week    Duration of exercise:  30-45 minutes    Taking medications regularly:  Yes    Medication side effects:  None    PHQ-2 Total Score: 2    Additional concerns today:  No      Hypertension   On lisinopril 20mg/day   Doesn't check at home     BP Readings from Last 6 Encounters:   03/24/23 (!) 155/93   04/12/22 138/86   03/29/22 (!) 140/96     High triglycerides last year   Wondering what to do about that     Hoping to get more active, walk more when snow melts    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 Not Applicable   No flowsheet data found.    Last PSA: No results found for: PSA    Reviewed orders with patient. Reviewed health maintenance and updated orders accordingly - Yes  Lab work is in process    Reviewed and updated as needed this visit by clinical staff   Tobacco  Allergies  Meds              Reviewed and updated as needed this visit by Provider                     Review of Systems   Constitutional: Negative for chills and fever.   HENT: Negative for  "congestion, ear pain, hearing loss and sore throat.    Eyes: Negative for pain and visual disturbance.   Respiratory: Negative for cough and shortness of breath.    Cardiovascular: Negative for chest pain, palpitations and peripheral edema.   Gastrointestinal: Negative for abdominal pain, constipation, diarrhea, heartburn, hematochezia and nausea.   Genitourinary: Negative for dysuria, frequency, genital sores, hematuria, impotence, penile discharge and urgency.   Musculoskeletal: Negative for arthralgias, joint swelling and myalgias.   Skin: Negative for rash.   Neurological: Negative for dizziness, weakness, headaches and paresthesias.   Psychiatric/Behavioral: Negative for mood changes. The patient is not nervous/anxious.        OBJECTIVE:   /88   Pulse 94   Temp 98.4  F (36.9  C) (Tympanic)   Ht 1.842 m (6' 0.5\")   Wt (!) 152.3 kg (335 lb 11.2 oz)   SpO2 98%   BMI 44.90 kg/m     BP Readings from Last 6 Encounters:   03/24/23 (!) 155/93   04/12/22 138/86   03/29/22 (!) 140/96         Physical Exam  GENERAL: healthy, alert and no distress  EYES: Eyes grossly normal to inspection, PERRL and conjunctivae and sclerae normal  HENT: ear canals and TM's normal, nose and mouth without ulcers or lesions  NECK: no adenopathy, no asymmetry, masses, or scars and thyroid normal to palpation  RESP: lungs clear to auscultation - no rales, rhonchi or wheezes  CV: regular rate and rhythm, normal S1 S2, no S3 or S4, no murmur, click or rub, no peripheral edema and peripheral pulses strong  ABDOMEN: soft, nontender, no hepatosplenomegaly, no masses and bowel sounds normal  MS: no gross musculoskeletal defects noted, no edema  SKIN: no suspicious lesions or rashes  NEURO: Normal strength and tone, mentation intact and speech normal  PSYCH: mentation appears normal, affect normal/bright    Diagnostic Test Results:  Labs reviewed in Epic    ASSESSMENT/PLAN:   (Z00.01) Encounter for routine adult medical exam with abnormal " "findings  (primary encounter diagnosis)  Comment: We discussed exercise 30mins/day, and calcium with vitamin D at 1200mg/day, preferably from dietary sources.  Diet, Weight loss, and Exercise were discussed as well .       (E78.1) Pure hypertriglyceridemia  Comment: discussed lifestyle changes. Recheck lipids   Plan: Lipid panel reflex to direct LDL Fasting            (I10) Benign essential hypertension  Comment: increase lisinopril from 20 to 40/day. Check lytes in two weeks. The patient indicates understanding of these issues and agrees with the plan.   Plan: lisinopril (ZESTRIL) 40 MG tablet, Basic         metabolic panel  (Ca, Cl, CO2, Creat, Gluc, K,         Na, BUN)            (Z13.1) Screening for diabetes mellitus  Comment:   Plan: Glucose              Patient has been advised of split billing requirements and indicates understanding: Yes      COUNSELING:   Reviewed preventive health counseling, as reflected in patient instructions       Regular exercise       Healthy diet/nutrition      BMI:   Estimated body mass index is 41.77 kg/m  as calculated from the following:    Height as of 3/29/22: 1.835 m (6' 0.25\").    Weight as of 3/29/22: 140.7 kg (310 lb 1.6 oz).   Weight management plan: Discussed healthy diet and exercise guidelines      He reports that he has never smoked. He has never used smokeless tobacco.        Zayda Gardner MD  [facility]  "

## 2023-04-21 ENCOUNTER — ALLIED HEALTH/NURSE VISIT (OUTPATIENT)
Dept: FAMILY MEDICINE | Facility: CLINIC | Age: 38
End: 2023-04-21
Payer: COMMERCIAL

## 2023-04-21 ENCOUNTER — LAB (OUTPATIENT)
Dept: LAB | Facility: CLINIC | Age: 38
End: 2023-04-21
Payer: COMMERCIAL

## 2023-04-21 VITALS — SYSTOLIC BLOOD PRESSURE: 128 MMHG | DIASTOLIC BLOOD PRESSURE: 86 MMHG

## 2023-04-21 DIAGNOSIS — I10 BENIGN ESSENTIAL HYPERTENSION: ICD-10-CM

## 2023-04-21 DIAGNOSIS — Z01.30 BLOOD PRESSURE CHECK: Primary | ICD-10-CM

## 2023-04-21 LAB
ANION GAP SERPL CALCULATED.3IONS-SCNC: 7 MMOL/L (ref 7–15)
BUN SERPL-MCNC: 11.6 MG/DL (ref 6–20)
CALCIUM SERPL-MCNC: 9 MG/DL (ref 8.6–10)
CHLORIDE SERPL-SCNC: 102 MMOL/L (ref 98–107)
CREAT SERPL-MCNC: 0.81 MG/DL (ref 0.67–1.17)
DEPRECATED HCO3 PLAS-SCNC: 29 MMOL/L (ref 22–29)
GFR SERPL CREATININE-BSD FRML MDRD: >90 ML/MIN/1.73M2
GLUCOSE SERPL-MCNC: 101 MG/DL (ref 70–99)
POTASSIUM SERPL-SCNC: 4.3 MMOL/L (ref 3.4–5.3)
SODIUM SERPL-SCNC: 138 MMOL/L (ref 136–145)

## 2023-04-21 PROCEDURE — 99207 PR NO CHARGE NURSE ONLY: CPT

## 2023-04-21 PROCEDURE — 36415 COLL VENOUS BLD VENIPUNCTURE: CPT

## 2023-04-21 PROCEDURE — 80048 BASIC METABOLIC PNL TOTAL CA: CPT

## 2023-04-21 NOTE — PROGRESS NOTES
Patient here today for a blood pressure check. He takes his BP medication every morning. His blood pressure was 130/82 and 128/86. He is not feeling dizzy or lightheaded. Provider TRAVIS Jordan CMA

## 2023-12-08 DIAGNOSIS — I10 BENIGN ESSENTIAL HYPERTENSION: ICD-10-CM

## 2023-12-08 RX ORDER — LISINOPRIL 40 MG/1
40 TABLET ORAL DAILY
Qty: 90 TABLET | Refills: 3 | OUTPATIENT
Start: 2023-12-08

## 2024-03-14 SDOH — HEALTH STABILITY: PHYSICAL HEALTH: ON AVERAGE, HOW MANY MINUTES DO YOU ENGAGE IN EXERCISE AT THIS LEVEL?: 60 MIN

## 2024-03-14 SDOH — HEALTH STABILITY: PHYSICAL HEALTH: ON AVERAGE, HOW MANY DAYS PER WEEK DO YOU ENGAGE IN MODERATE TO STRENUOUS EXERCISE (LIKE A BRISK WALK)?: 1 DAY

## 2024-03-14 ASSESSMENT — SOCIAL DETERMINANTS OF HEALTH (SDOH): HOW OFTEN DO YOU GET TOGETHER WITH FRIENDS OR RELATIVES?: NEVER

## 2024-03-21 ENCOUNTER — OFFICE VISIT (OUTPATIENT)
Dept: FAMILY MEDICINE | Facility: CLINIC | Age: 39
End: 2024-03-21
Payer: COMMERCIAL

## 2024-03-21 VITALS
WEIGHT: 315 LBS | RESPIRATION RATE: 20 BRPM | TEMPERATURE: 97.7 F | DIASTOLIC BLOOD PRESSURE: 88 MMHG | BODY MASS INDEX: 42.66 KG/M2 | OXYGEN SATURATION: 98 % | SYSTOLIC BLOOD PRESSURE: 136 MMHG | HEIGHT: 72 IN | HEART RATE: 76 BPM

## 2024-03-21 DIAGNOSIS — E78.1 HYPERTRIGLYCERIDEMIA: ICD-10-CM

## 2024-03-21 DIAGNOSIS — E66.01 MORBID OBESITY (H): ICD-10-CM

## 2024-03-21 DIAGNOSIS — Z00.01 ENCOUNTER FOR ROUTINE ADULT MEDICAL EXAM WITH ABNORMAL FINDINGS: Primary | ICD-10-CM

## 2024-03-21 DIAGNOSIS — I10 BENIGN ESSENTIAL HYPERTENSION: ICD-10-CM

## 2024-03-21 LAB
ANION GAP SERPL CALCULATED.3IONS-SCNC: 12 MMOL/L (ref 7–15)
BUN SERPL-MCNC: 13.1 MG/DL (ref 6–20)
CALCIUM SERPL-MCNC: 9.2 MG/DL (ref 8.6–10)
CHLORIDE SERPL-SCNC: 101 MMOL/L (ref 98–107)
CHOLEST SERPL-MCNC: 223 MG/DL
CREAT SERPL-MCNC: 0.78 MG/DL (ref 0.67–1.17)
DEPRECATED HCO3 PLAS-SCNC: 27 MMOL/L (ref 22–29)
EGFRCR SERPLBLD CKD-EPI 2021: >90 ML/MIN/1.73M2
FASTING STATUS PATIENT QL REPORTED: YES
GLUCOSE SERPL-MCNC: 92 MG/DL (ref 70–99)
HDLC SERPL-MCNC: 29 MG/DL
LDLC SERPL CALC-MCNC: 125 MG/DL
NONHDLC SERPL-MCNC: 194 MG/DL
POTASSIUM SERPL-SCNC: 4.6 MMOL/L (ref 3.4–5.3)
SODIUM SERPL-SCNC: 140 MMOL/L (ref 135–145)
TRIGL SERPL-MCNC: 344 MG/DL

## 2024-03-21 PROCEDURE — 80048 BASIC METABOLIC PNL TOTAL CA: CPT | Performed by: FAMILY MEDICINE

## 2024-03-21 PROCEDURE — 80061 LIPID PANEL: CPT | Performed by: FAMILY MEDICINE

## 2024-03-21 PROCEDURE — 99213 OFFICE O/P EST LOW 20 MIN: CPT | Mod: 25 | Performed by: FAMILY MEDICINE

## 2024-03-21 PROCEDURE — 36415 COLL VENOUS BLD VENIPUNCTURE: CPT | Performed by: FAMILY MEDICINE

## 2024-03-21 PROCEDURE — 99395 PREV VISIT EST AGE 18-39: CPT | Performed by: FAMILY MEDICINE

## 2024-03-21 RX ORDER — LISINOPRIL 40 MG/1
40 TABLET ORAL DAILY
Qty: 90 TABLET | Refills: 3 | Status: SHIPPED | OUTPATIENT
Start: 2024-03-21

## 2024-03-21 NOTE — PATIENT INSTRUCTIONS
Check home blood pressure - send me the data in a Doujiao message   Consider treating triglycerides if elevated     Preventive Care Advice   This is general advice given by our system to help you stay healthy. However, your care team may have specific advice just for you. Please talk to your care team about your preventive care needs.  Nutrition  Eat 5 or more servings of fruits and vegetables each day.  Try wheat bread, brown rice and whole grain pasta (instead of white bread, rice, and pasta).  Get enough calcium and vitamin D. Check the label on foods and aim for 100% of the RDA (recommended daily allowance).  Lifestyle  Exercise at least 150 minutes each week   (30 minutes a day, 5 days a week).  Do muscle strengthening activities 2 days a week. These help control your weight and prevent disease.  No smoking.  Wear sunscreen to prevent skin cancer.  Have a dental exam and cleaning every 6 months.  Yearly exams  See your health care team every year to talk about:  Any changes in your health.  Any medicines your care team has prescribed.  Preventive care, family planning, and ways to prevent chronic diseases.  Shots (vaccines)   HPV shots (up to age 26), if you've never had them before.  Hepatitis B shots (up to age 59), if you've never had them before.  COVID-19 shot: Get this shot when it's due.  Flu shot: Get a flu shot every year.  Tetanus shot: Get a tetanus shot every 10 years.  Pneumococcal, hepatitis A, and RSV shots: Ask your care team if you need these based on your risk.  Shingles shot (for age 50 and up).  General health tests  Diabetes screening:  Starting at age 35, Get screened for diabetes at least every 3 years.  If you are younger than age 35, ask your care team if you should be screened for diabetes.  Cholesterol test: At age 39, start having a cholesterol test every 5 years, or more often if advised.  Bone density scan (DEXA): At age 50, ask your care team if you should have this scan for  osteoporosis (brittle bones).  Hepatitis C: Get tested at least once in your life.  STIs (sexually transmitted infections)  Before age 24: Ask your care team if you should be screened for STIs.  After age 24: Get screened for STIs if you're at risk. You are at risk for STIs (including HIV) if:  You are sexually active with more than one person.  You don't use condoms every time.  You or a partner was diagnosed with a sexually transmitted infection.  If you are at risk for HIV, ask about PrEP medicine to prevent HIV.  Get tested for HIV at least once in your life, whether you are at risk for HIV or not.  Cancer screening tests  Cervical cancer screening: If you have a cervix, begin getting regular cervical cancer screening tests at age 21. Most people who have regular screenings with normal results can stop after age 65. Talk about this with your provider.  Breast cancer scan (mammogram): If you've ever had breasts, begin having regular mammograms starting at age 40. This is a scan to check for breast cancer.  Colon cancer screening: It is important to start screening for colon cancer at age 45.  Have a colonoscopy test every 10 years (or more often if you're at risk) Or, ask your provider about stool tests like a FIT test every year or Cologuard test every 3 years.  To learn more about your testing options, visit: https://[email or web address]/988522.pdf.  For help making a decision, visit: https://bit.ly/da50327.  Prostate cancer screening test: If you have a prostate and are age 55 to 69, ask your provider if you would benefit from a yearly prostate cancer screening test.  Lung cancer screening: If you are a current or former smoker age 50 to 80, ask your care team if ongoing lung cancer screenings are right for you.  For informational purposes only. Not to replace the advice of your health care provider. Copyright   2023 [employer]. All rights reserved. Clinically reviewed by the [facility]  Transitions Program. SMARTworks 647484 - REV 01/24.    Relationships for Good Health  Relationships are important for our health and happiness. Social isolation, loneliness and lack of support are bad for your health. Studies show that loneliness can harm health and limit your life span as much as high blood pressure and smoking.   Take some time to reflect on your relationships. Then answer these questions:  Are there people in your life that cause you stress or drain your energy? What can you do to set limits?  ________________________________________________________________________________________________________________________________________________________________________________________________________________________________________________________________________________________________________________________________________________  Who do you enjoy spending time with? Who can you go to for support?  ________________________________________________________________________________________________________________________________________________________________________________________________________________________________________________________________________________________________________________________________________________  What can you do to improve your relationships with others?  __________________________________________________________________________________________________________________________________________________________________________________________________________________  ______________________________________________________________________________________________________________________________  What do you like most about your relationships with  others?  ________________________________________________________________________________________________________________________________________________________________________________________________________________________________________________________________________________________________________________________________________________  My goal: ______________________________________________________________________  I will ______________________________________________________________________________________________________________________________________________________________________________________________    For informational purposes only. Not to replace the advice of your health care provider. Copyright   2018 [facility]. All rights reserved. Clinically reviewed by [facility]  Team. [employer] 795054 - Rev 04/21.    Learning About Stress  What is stress?     Stress is your body's response to a hard situation. Your body can have a physical, emotional, or mental response. Stress is a fact of life for most people, and it affects everyone differently. What causes stress for you may not be stressful for someone else.  A lot of things can cause stress. You may feel stress when you go on a job interview, take a test, or run a race. This kind of short-term stress is normal and even useful. It can help you if you need to work hard or react quickly. For example, stress can help you finish an important job on time.  Long-term stress is caused by ongoing stressful situations or events. Examples of long-term stress include long-term health problems, ongoing problems at work, or conflicts in your family. Long-term stress can harm your health.  How does stress affect your health?  When you are stressed, your body responds as though you are in danger. It makes hormones that speed up your heart, make you breathe faster, and give you a burst of energy. This is called the fight-or-flight stress  response. If the stress is over quickly, your body goes back to normal and no harm is done.  But if stress happens too often or lasts too long, it can have bad effects. Long-term stress can make you more likely to get sick, and it can make symptoms of some diseases worse. If you tense up when you are stressed, you may develop neck, shoulder, or low back pain. Stress is linked to high blood pressure and heart disease.  Stress also harms your emotional health. It can make you carey, tense, or depressed. Your relationships may suffer, and you may not do well at work or school.  What can you do to manage stress?  You can try these things to help manage stress:   Do something active. Exercise or activity can help reduce stress. Walking is a great way to get started. Even everyday activities such as housecleaning or yard work can help.  Try yoga or brandie chi. These techniques combine exercise and meditation. You may need some training at first to learn them.  Do something you enjoy. For example, listen to music or go to a movie. Practice your hobby or do volunteer work.  Meditate. This can help you relax, because you are not worrying about what happened before or what may happen in the future.  Do guided imagery. Imagine yourself in any setting that helps you feel calm. You can use online videos, books, or a teacher to guide you.  Do breathing exercises. For example:  From a standing position, bend forward from the waist with your knees slightly bent. Let your arms dangle close to the floor.  Breathe in slowly and deeply as you return to a standing position. Roll up slowly and lift your head last.  Hold your breath for just a few seconds in the standing position.  Breathe out slowly and bend forward from the waist.  Let your feelings out. Talk, laugh, cry, and express anger when you need to. Talking with supportive friends or family, a counselor, or a eun leader about your feelings is a healthy way to relieve stress.  "Avoid discussing your feelings with people who make you feel worse.  Write. It may help to write about things that are bothering you. This helps you find out how much stress you feel and what is causing it. When you know this, you can find better ways to cope.  What can you do to prevent stress?  You might try some of these things to help prevent stress:  Manage your time. This helps you find time to do the things you want and need to do.  Get enough sleep. Your body recovers from the stresses of the day while you are sleeping.  Get support. Your family, friends, and community can make a difference in how you experience stress.  Limit your news feed. Avoid or limit time on social media or news that may make you feel stressed.  Do something active. Exercise or activity can help reduce stress. Walking is a great way to get started.  Where can you learn more?  Go to https://www.[employer]dexLink.net/patiented  Enter N032 in the search box to learn more about \"Learning About Stress.\"  Current as of: October 24, 2023               Content Version: 14.0    9269-2040 [employer].   Care instructions adapted under license by your healthcare professional. If you have questions about a medical condition or this instruction, always ask your healthcare professional. [employer] disclaims any warranty or liability for your use of this information.      "

## 2024-03-21 NOTE — PROGRESS NOTES
"Preventive Care Visit  [facility] JERSON Gardner MD, Family Medicine  Mar 21, 2024      Assessment & Plan     (Z00.01) Encounter for routine adult medical exam with abnormal findings  (primary encounter diagnosis)  Comment: We discussed exercise 30mins/day, and calcium with vitamin D at 1200mg/day, preferably from dietary sources. Reviewed vaccinations .     He is feeling a little low. Declines mental health referral and discussion of mental health issues. Will monitor and let me know if symptoms persist/worsen.     (I10) Benign essential hypertension  Comment: He will check at home and send me some data points in a couple weeks. We may need to adjust if running high. The patient indicates understanding of these issues and agrees with the plan.   Plan: lisinopril (ZESTRIL) 40 MG tablet, Basic         metabolic panel  (Ca, Cl, CO2, Creat, Gluc, K,         Na, BUN)            (E66.01) Morbid obesity (H)  Comment: discussed diet/exercise   Plan:     (E78.1) Hypertriglyceridemia  Comment: discussed rechecking today. Recommended dietary changes and exercise. If still running >500, consider starting fenofibrate - he is willing. The patient indicates understanding of these issues and agrees with the plan.   Plan: Lipid panel reflex to direct LDL Non-fasting            Patient has been advised of split billing requirements and indicates understanding: Yes      BMI  Estimated body mass index is 44.35 kg/m  as calculated from the following:    Height as of this encounter: 1.835 m (6' 0.24\").    Weight as of this encounter: 149.3 kg (329 lb 3.2 oz).   Weight management plan: Discussed healthy diet and exercise guidelines    Counseling  Appropriate preventive services were discussed with this patient, including applicable screening as appropriate for fall prevention, nutrition, physical activity, Tobacco-use cessation, weight loss and cognition.  Checklist reviewing preventive services available has been " given to the patient.  Reviewed patient's diet, addressing concerns and/or questions.   He is at risk for lack of exercise and has been provided with information to increase physical activity for the benefit of his well-being.   Patient is at risk for social isolation and has been provided with information about the benefit of social connection.   He is at risk for psychosocial distress and has been provided with information to reduce risk.       Sheron Knight is a 38 year old, presenting for the following:  Physical        3/21/2024     8:36 AM   Additional Questions   Roomed by COLLIN Sosa   Accompanied by self        Health Care Directive  Patient does not have a Health Care Directive or Living Will: Discussed advance care planning with patient; however, patient declined at this time.    HPI    Asked patient- Anything else that will need to be discussed during this visit that I should make note of for the provider?                          Patient's Reply: no       Hypertension Follow-up  Fasting today for labs, needs medication renewal     Do you check your blood pressure regularly outside of the clinic? No   Are you following a low salt diet? No  Are your blood pressures ever more than 140 on the top number (systolic) OR more   than 90 on the bottom number (diastolic), for example 140/90? Na    Bp at goal when he donates plasma           3/14/2024   General Health   How would you rate your overall physical health? (!) FAIR   Feel stress (tense, anxious, or unable to sleep) Only a little   (!) STRESS CONCERN      3/14/2024   Nutrition   Three or more servings of calcium each day? Yes   Diet: Regular (no restrictions)   How many servings of fruit and vegetables per day? (!) 2-3   How many sweetened beverages each day? (!) 2         3/14/2024   Exercise   Days per week of moderate/strenous exercise 1 day   Average minutes spent exercising at this level 60 min   (!) EXERCISE CONCERN      3/14/2024   Social  "Factors   Frequency of gathering with friends or relatives Never   Worry food won't last until get money to buy more No   Food not last or not have enough money for food? No   Do you have housing?  Yes   Are you worried about losing your housing? No   Lack of transportation? No   Unable to get utilities (heat,electricity)? No   (!) SOCIAL CONNECTIONS CONCERN      3/14/2024   Dental   Dentist two times every year? Yes         3/14/2024   TB Screening   Were you born outside of the [place]? No         Today's PHQ-2 Score:       3/20/2024    10:00 AM   PHQ-2 ( 1999 Pfizer)   Q1: Little interest or pleasure in doing things 1   Q2: Feeling down, depressed or hopeless 1   PHQ-2 Score 2   Q1: Little interest or pleasure in doing things Several days   Q2: Feeling down, depressed or hopeless Several days   PHQ-2 Score 2           3/14/2024   Substance Use   Alcohol more than 3/day or more than 7/wk Not Applicable   Do you use any other substances recreationally? No     Social History     Tobacco Use    Smoking status: Never    Smokeless tobacco: Never   Vaping Use    Vaping Use: Never used   Substance Use Topics    Alcohol use: Never    Drug use: Never           3/14/2024   STI Screening   New sexual partner(s) since last STI/HIV test? No         3/14/2024   Contraception/Family Planning   Questions about contraception or family planning No        Reviewed and updated as needed this visit by Provider                      Review of Systems  Constitutional, neuro, ENT, endocrine, pulmonary, cardiac, gastrointestinal, genitourinary, musculoskeletal, integument and psychiatric systems are negative, except as otherwise noted.     Objective    Exam  /88   Pulse 76   Temp 97.7  F (36.5  C) (Tympanic)   Resp 20   Ht 1.835 m (6' 0.24\")   Wt 149.3 kg (329 lb 3.2 oz)   SpO2 98%   BMI 44.35 kg/m     Estimated body mass index is 44.9 kg/m  as calculated from the following:    Height as of 3/24/23: 1.842 m (6' 0.5\").    Weight as " of 3/24/23: 152.3 kg (335 lb 11.2 oz).      Physical Exam  GENERAL: alert and no distress  EYES: Eyes grossly normal to inspection, PERRL and conjunctivae and sclerae normal  HENT: ear canals and TM's normal, nose and mouth without ulcers or lesions  NECK: no adenopathy, no asymmetry, masses, or scars  RESP: lungs clear to auscultation - no rales, rhonchi or wheezes  CV: regular rate and rhythm, normal S1 S2, no S3 or S4, no murmur, click or rub, no peripheral edema  ABDOMEN: soft, nontender, no hepatosplenomegaly, no masses and bowel sounds normal  MS: no gross musculoskeletal defects noted, no edema  SKIN: no suspicious lesions or rashes  NEURO: Normal strength and tone, mentation intact and speech normal  PSYCH: mentation appears normal, affect normal/bright      Signed Electronically by: Zayda Gardner MD

## 2025-02-28 PROBLEM — F34.1 DYSTHYMIA: Status: ACTIVE | Noted: 2025-02-28

## 2025-02-28 PROBLEM — Z82.49 FAMILY HISTORY OF ISCHEMIC HEART DISEASE: Status: ACTIVE | Noted: 2025-02-28

## 2025-03-05 ENCOUNTER — OFFICE VISIT (OUTPATIENT)
Dept: URGENT CARE | Facility: URGENT CARE | Age: 40
End: 2025-03-05
Payer: COMMERCIAL

## 2025-03-05 ENCOUNTER — MYC MEDICAL ADVICE (OUTPATIENT)
Dept: FAMILY MEDICINE | Facility: CLINIC | Age: 40
End: 2025-03-05

## 2025-03-05 VITALS
BODY MASS INDEX: 43.92 KG/M2 | WEIGHT: 315 LBS | TEMPERATURE: 98.6 F | RESPIRATION RATE: 20 BRPM | OXYGEN SATURATION: 99 % | SYSTOLIC BLOOD PRESSURE: 92 MMHG | HEART RATE: 106 BPM | DIASTOLIC BLOOD PRESSURE: 54 MMHG

## 2025-03-05 DIAGNOSIS — I10 BENIGN ESSENTIAL HYPERTENSION: ICD-10-CM

## 2025-03-05 DIAGNOSIS — R42 DIZZINESS: Primary | ICD-10-CM

## 2025-03-05 DIAGNOSIS — I95.1 ORTHOSTATIC HYPOTENSION: ICD-10-CM

## 2025-03-05 LAB
ANION GAP SERPL CALCULATED.3IONS-SCNC: 9 MMOL/L (ref 7–15)
BUN SERPL-MCNC: 18.9 MG/DL (ref 6–20)
CALCIUM SERPL-MCNC: 10 MG/DL (ref 8.8–10.4)
CHLORIDE SERPL-SCNC: 101 MMOL/L (ref 98–107)
CREAT SERPL-MCNC: 0.85 MG/DL (ref 0.67–1.17)
EGFRCR SERPLBLD CKD-EPI 2021: >90 ML/MIN/1.73M2
ERYTHROCYTE [DISTWIDTH] IN BLOOD BY AUTOMATED COUNT: 11.9 % (ref 10–15)
GLUCOSE SERPL-MCNC: 99 MG/DL (ref 70–99)
HCO3 SERPL-SCNC: 29 MMOL/L (ref 22–29)
HCT VFR BLD AUTO: 40.5 % (ref 40–53)
HGB BLD-MCNC: 14 G/DL (ref 13.3–17.7)
MCH RBC QN AUTO: 29.1 PG (ref 26.5–33)
MCHC RBC AUTO-ENTMCNC: 34.6 G/DL (ref 31.5–36.5)
MCV RBC AUTO: 84 FL (ref 78–100)
PLATELET # BLD AUTO: 361 10E3/UL (ref 150–450)
POTASSIUM SERPL-SCNC: 4.2 MMOL/L (ref 3.4–5.3)
RBC # BLD AUTO: 4.81 10E6/UL (ref 4.4–5.9)
SODIUM SERPL-SCNC: 139 MMOL/L (ref 135–145)
WBC # BLD AUTO: 14.5 10E3/UL (ref 4–11)

## 2025-03-05 PROCEDURE — 36415 COLL VENOUS BLD VENIPUNCTURE: CPT | Performed by: PHYSICIAN ASSISTANT

## 2025-03-05 PROCEDURE — 3078F DIAST BP <80 MM HG: CPT | Performed by: PHYSICIAN ASSISTANT

## 2025-03-05 PROCEDURE — 80048 BASIC METABOLIC PNL TOTAL CA: CPT | Performed by: PHYSICIAN ASSISTANT

## 2025-03-05 PROCEDURE — 85027 COMPLETE CBC AUTOMATED: CPT | Performed by: PHYSICIAN ASSISTANT

## 2025-03-05 PROCEDURE — 99204 OFFICE O/P NEW MOD 45 MIN: CPT | Performed by: PHYSICIAN ASSISTANT

## 2025-03-05 PROCEDURE — 3074F SYST BP LT 130 MM HG: CPT | Performed by: PHYSICIAN ASSISTANT

## 2025-03-05 NOTE — PATIENT INSTRUCTIONS
Hold your hydrochlorothiazide.    Push fluids.   Get several readings at home and follow up with your primary care provider in the next 2 weeks to discuss BP, consider other medication if medication is needed.     I will call with lab results if needed, otherwise if normal will be on my chart.

## 2025-03-05 NOTE — PROGRESS NOTES
"Assessment & Plan     Dizziness  Dizziness is likely secondary to orthostatics, likely from the addition of his hydrochlorothiazide even though it is very low-dose.  Would recommend he hold this medication for the time being.  He will get several readings at home and will address with his primary care provider within the next 2 weeks.  Will check electrolytes and CBC today and contact patient with results.      - CBC with platelets  - CBC with platelets    Orthostatic hypotension- CBC with platelets  - CBC with platelets    Benign essential hypertension  Hold hydrochlorothiazide, likley orthostatic due to this medication addition.  Getting low enough where would be concernd about syncope given the episode today.    Follow-up with pcp with in 2 weeks.    - BASIC METABOLIC PANEL             Aleena Davis PA-C  [facility] MALIA Knight is a 39 year old male who presents to clinic today for the following health issues:  Chief Complaint   Patient presents with    Pharyngitis     X 1 day dizziness today at work. Throat pain in the middle if neck- started on Saturday. States not really sore throat related to sickness but unsure.        HPI  Patient is a 39-year-old male presents to urgent care with concerns regarding episode of dizziness that occurred today prior to arrival.  He was sitting at his desk and stood up, had an episode of feeling like he could pass out feeling lightheaded and his vision started to get gray around the periphery.  He sat down and continued to feel lightheaded-dizzy for about 20 minutes    No sob, chest discomfort.  Has a little irrigation and \"winded\" with forceful cough in his anterior throat burn neck region.  He woke this morning with a little bit of a sore throat but had a popsicle and that seem to be helpful for his symptoms.  He denies any fevers.  He recently was started on hydrochlorothiazide 12.5 mg for his hypertension which was not at goal.  " He does monitor his blood pressure regularly at home and it has been in the normal range with last BP 3 days ago.  He did have to take his car out this morning from the snow and wonders if that may be causing his symptoms.  He has been eating normally today.    Review of Systems  Constitutional, HEENT, cardiovascular, pulmonary, gi and gu systems are negative, except as otherwise noted.      Objective    BP 92/54   Pulse 106   Temp 98.6  F (37  C) (Oral)   Resp 20   Wt (!) 147.9 kg (326 lb)   SpO2 99%   BMI 43.92 kg/m    Physical Exam     BP Readings from Last 6 Encounters:   03/05/25 92/54   02/28/25 138/82   03/21/24 136/88   04/21/23 128/86   03/24/23 137/88   04/12/22 138/86    Pt is in no acute distress and appears well  Ears patent B:  TM s intact, non-injected. All land marks easily visibile    Lungs: CTA  Heart: RRR, no murmur, no thrills or heaves   Ext: no edema  Skin: no rashes    Orthostatics done:    Supine : 102/65; P 83   Sitting : 97/57 P 90  Standing 92/54, P 106

## 2025-04-06 ENCOUNTER — DOCUMENTATION ONLY (OUTPATIENT)
Dept: LAB | Facility: CLINIC | Age: 40
End: 2025-04-06
Payer: COMMERCIAL

## 2025-04-06 DIAGNOSIS — E78.2 MIXED HYPERLIPIDEMIA: ICD-10-CM

## 2025-04-06 DIAGNOSIS — E78.1 HYPERTRIGLYCERIDEMIA: Primary | ICD-10-CM

## 2025-04-06 NOTE — PROGRESS NOTES
Mayco Bobo has an upcoming lab appointment:    Future Appointments   Date Time Provider Department Center   4/25/2025  8:30 AM [facility]     Patient is scheduled for the following lab(s): Lipid test    There is no order available. Please review and place either future orders or HMPO (Review of Health Maintenance Protocol Orders), as appropriate.    There are no preventive care reminders to display for this patient.  Christine Zee

## 2025-06-28 ENCOUNTER — RESULTS FOLLOW-UP (OUTPATIENT)
Dept: FAMILY MEDICINE | Facility: CLINIC | Age: 40
End: 2025-06-28